# Patient Record
Sex: FEMALE
[De-identification: names, ages, dates, MRNs, and addresses within clinical notes are randomized per-mention and may not be internally consistent; named-entity substitution may affect disease eponyms.]

---

## 2024-08-21 ENCOUNTER — APPOINTMENT (OUTPATIENT)
Dept: PLASTIC SURGERY | Facility: CLINIC | Age: 49
End: 2024-08-21
Payer: COMMERCIAL

## 2024-08-21 VITALS — OXYGEN SATURATION: 99 % | HEIGHT: 68 IN | HEART RATE: 87 BPM | BODY MASS INDEX: 18.86 KG/M2 | WEIGHT: 124.45 LBS

## 2024-08-21 DIAGNOSIS — Z78.9 OTHER SPECIFIED HEALTH STATUS: ICD-10-CM

## 2024-08-21 DIAGNOSIS — F17.200 NICOTINE DEPENDENCE, UNSPECIFIED, UNCOMPLICATED: ICD-10-CM

## 2024-08-21 DIAGNOSIS — F64.9 GENDER IDENTITY DISORDER, UNSPECIFIED: ICD-10-CM

## 2024-08-21 PROBLEM — Z00.00 ENCOUNTER FOR PREVENTIVE HEALTH EXAMINATION: Status: ACTIVE | Noted: 2024-08-21

## 2024-08-21 PROCEDURE — 99204 OFFICE O/P NEW MOD 45 MIN: CPT

## 2024-08-21 RX ORDER — ESTRADIOL VALERATE
POWDER (GRAM) MISCELLANEOUS
Refills: 0 | Status: ACTIVE | COMMUNITY

## 2024-08-26 NOTE — DISCUSSION/SUMMARY
[FreeTextEntry1] : This friendly patient began transitioning since 19 years old She has been on hormonal therapy consistently since 30 years old She has been in therapy and was diagnosed with Gender Dysphoria She is concerned about certain facial features that appear masculine She desires facial feminization surgery and is followed by a Transgender team The following facial features appear masculine and will need to be modified in order for her to achieve a more feminine appearance: -Brow -Nose -Jawline -Cheeks -Neck   PSxHx: None   +Silicone injections in cheeks  PMedHx: Denied any   Medications: -Estrogen   FH: noncontributory   SH: + Smoke cigarettes, i pack per day for 3 years, occasional marijuana use,  no secondary tobacco use,   ROS: General health / Constitutional      no fever, no chills, no unusual weight changes, no energy level changes, no night sweats Skin       No color or pigmentation changes, no pruritis, no rashes, no ulcers, Hair       No changes in color, texture,  distribution, loss Nails       No color changes, brittleness, infection Head       No headaches, no new jaw pain Eyes       Good visual acuity, no color blindness, no corrective lenses, no photophobia, no diplopia, no blurred vision, no infection, pain, no medications, Ear      no tinnitus, no discharge, no pain, no medications Nose      no epistaxis, no rhinorrhea, no rhinitis, no pain, Mouth & Throat      no gingivitis, no gingival bleeding, no ulcers, no voice changes, no changes in oral mucosa or tongue Neck      no stiffness, no pain, no lymphadenitis, no thyroid disorders, Respiratory      no cough, no dyspnea, no wheezing,  no chest pain, cyanosis, no pneumonia, no asthma, Cardiovascular      no chest pain, no palpitations, no irregular rhythm, no tachycardia, no bradycardia, no heart failure, no dyspnea on exertion (FIGUEROA), no edema Gastrointestinal      no nausea / vomiting, no dysphagia, no reflux / GERD, no abdominal pain, no jaundice Musculoskeletal      no pain in muscles, bones, or joints; no fractures, no dislocations, no muscular weakness, no atrophy Neurological      no paresis, no paralysis, no paresthesia, no seizures, no dizziness, no syncope, no ataxia, no tremor Psychological      no childhood behavioral problems, no irritability, no sleep changes Hematological      no anemia, no bruising, no bleeding tendencies,   PHYSICAL EXAM General Thin and face drawn, in no distress,  A & O x 3 (person, place, time) HEENT Head: AT/NC (atraumatic, normocephalic), including TMJ, sensory and motor; very High forehead with recessed hairline, Prominent, bossed brow (Type III) and lateral orbital rim Eyes: EOMI, PERRLA Ears: exterior, nl hearing, Nose: thick skin, wide nasal dorsum, bulbous nasal tip with acute nasiolabial angle intranasal exam showed enlarged turbinates and deviated caudal septum Throat & mouth: gd palate elevation, nl tongue mobility, nl tonsil size Prominent mandibular angle with active masseter, widened lower face Wide, boxy chin Thin lips Hypoplastic malar and maxillary regions   Neck: no masses, nl pulses, excess submental fat with neck ptosis and lack of cervical-mental angle prominent tracheal bulge ("Isaac's Apple")   We had a 45 minute meeting with the patient discussing diagnosis and medical management issues and outcomes.  FFS: -Brow lift -frontal sinus setback -supraorbital brow recontouring -mandibular angle reduction b/l -chin narrowing -rhinoplasty, SMR, cartilage grafting -Submental fat excision and platysmaplasty (neck tightening) -cheek implants -tracheal shave   Needs a 3D CT scan and Virtual Surgical Planning She will need to provide a letter from her therapist and hormone provider   CPT 97526: Frontal sinus setback CPT 71532: Orbital reconstruction CPT 27862: Bilateral browlift, Hairline lowering CPT 61385: Supraorbital contouring CPT 65783 Mandibular angle resection CPT 58384: Osseous genioplasty CPT 89703: Septorhinoplasty CPT 60037: Cartilage grafting to nose CPT 51038: Tracheal Shave CPT 55158: Platysmaplasty, Necklift CPT 60797-65: Bilateral cheek implants  The following feature modifications will be requested based on the below medical necessity reasons:   97191 (Frontal sinus setback): Previous exposure to testosterone has led this patient to have a male appearing forehead with a more bossed shaped; this is opposed to a female appearing forehead that is more flat. A frontal sinus setback procedure will reshape the anterior wall of the frontal sinus by pushing back the bone and change the contour from convex (male-shape) to flat (female-shape). This surgical change will create a more flattened feminine brow appearance.  72587 (Browlift): Previous exposure to testosterone has led to the patient having a male M-shaped hairline as opposed to a female upside-down U-shaped hairline. Her receded hairline also creates a high, broad male appearing forehead. Her low set eyebrows on top of her orbital roof rim give her a beltrán, male-appearing look. Both of these male traits: a high hairline and low-set brows are causing her intense feelings of dysphoria. She would benefit from bilateral browlift and hairline lowering procedures. Raising her lateral eyebrow with a bilateral browlift will create a more female appearance. She has stressed a strong desire to wear her own natural hair and does not want to always have to wear a wig to cover up her male features.  89847 (bilateral orbital reconstruction): The bone growth that occurred during testosterone exposure in the upper half of each orbital has caused this patient to have male appearing orbital features that contribute to the sense of dysphoria she feels in public. Orbital reconstruction with a reciprocating saw for an L-shaped ostectomy, on both sides will help remove the excessive bony protrusion; this will be followed by bone material grafting and resorbable plate fixation. The bilateral orbital reconstruction will help alleviate these orbital and upper facial male features.  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 Supraorbital contouring of this lateral orbital region with a pineapple leona will correct this male feature that is causing this patient dysphoria. These procedures also allows us to do a success browlift procedure since the overhang of bone can inhibit the upper movement of the brow and the removal of this allows for an effective lift.  36033 (mandibular angle resection/reduction): This patient has an angular, more boxy jaw which results in male appearing lower face. She also has increased lower facial width related to her mandibular angle lateral projection. Mandibular angle resection/reduction will create a more feminine triangular jaw. By having a mandibular angle reduction, the lower facial width is narrowed and this will create a V-shaped, feminine appearance of the jawline when viewed from the front.  64015 (osseous genioplasty): This patient has a wide, large chin that contributes to her feelings of gender dysphoria and being mis-gendered in public. The patient would benefit from an osseous genioplasty that narrows and shortens the chin. The osseous genioplasty will help create a more feminine and more, slender chin.   56820 (Rhinoplasty): This patients nose has characteristics of a male nose. The male nose is often larger and wider with a more bulbous nasal tip. These male nasal features make her feel masculine which exacerbates her gender dysphoria. A rhinoplasty would be beneficial to feminize her nose by creating a smaller nose and more delicate, softer nasal tip. The lateral dorsal shape will also be feminized by the rhinoplasty. This patients nasal septum is shaped like a male septum. The septum is the supportive pole that holds up the structure of the nasal pyramid. In this case the septum will also be used to provide cartilage tissue necessary for nasal grafts. This septoplasty is required to modify the septum to create a straight nose with good functional breathing while taking away the characteristics of a male nose.  06693 (Cartilage grafting for nasal reconstruction): Cartilage grafting is crucial for the performance of the feminizing rhinoplasty. This patient has an ethnic type of nose. With regards to her nose, she wants to keep true to her ethnicity while appearing more feminine. To do that cartilage grafts including, bilateral  grafts, a columellar strut graft, a dorsal onlay graft, and nasal tip graft are all necessary. The dorsal onlay graft will raise the nasal radix and improve the frontonasal regional shape.  75169 (Platysmaplasty): With prolonged testosterone exposure, the submental region will appear full and ptotic. This patient has a full and ptotic submental and neck region which is associated with a male-appearing neck. The female neck is slender and tight. The platymaplasty, performed after submental fat excision, will help create a slender and tight, female appearing neck. At times we also do a partial resection of the digastric muscle and excise the submandibular glands to create a better cervicomental angle.  21112 (Tracheal shave or tracheolaryngoplasty): A prominent "Isaac's Apple" is a feature associated with males. Not all trans-women have a prominent "Isaac's Apple". However, this trans-woman has a prominent "Isaac's Apple" (also known as laryngeal prominence). This is seen on lateral head position and when her head is raised to the scot. It causes her intense feelings of dysphoria and it is a cause for misgendering. Therefore, the patient would benefit from a tracheal shave procedure which eliminates this prominent "Isaac's Apple" associated with male appearance.   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 She would benefit from implants lateral to the nose on both sides to establish a dailey appearance that softens the face so it will look less harsh.

## 2024-08-26 NOTE — DISCUSSION/SUMMARY
[FreeTextEntry1] : This friendly patient began transitioning since 19 years old She has been on hormonal therapy consistently since 30 years old She has been in therapy and was diagnosed with Gender Dysphoria She is concerned about certain facial features that appear masculine She desires facial feminization surgery and is followed by a Transgender team The following facial features appear masculine and will need to be modified in order for her to achieve a more feminine appearance: -Brow -Nose -Jawline -Cheeks -Neck   PSxHx: None   +Silicone injections in cheeks  PMedHx: Denied any   Medications: -Estrogen   FH: noncontributory   SH: + Smoke cigarettes, i pack per day for 3 years, occasional marijuana use,  no secondary tobacco use,   ROS: General health / Constitutional      no fever, no chills, no unusual weight changes, no energy level changes, no night sweats Skin       No color or pigmentation changes, no pruritis, no rashes, no ulcers, Hair       No changes in color, texture,  distribution, loss Nails       No color changes, brittleness, infection Head       No headaches, no new jaw pain Eyes       Good visual acuity, no color blindness, no corrective lenses, no photophobia, no diplopia, no blurred vision, no infection, pain, no medications, Ear      no tinnitus, no discharge, no pain, no medications Nose      no epistaxis, no rhinorrhea, no rhinitis, no pain, Mouth & Throat      no gingivitis, no gingival bleeding, no ulcers, no voice changes, no changes in oral mucosa or tongue Neck      no stiffness, no pain, no lymphadenitis, no thyroid disorders, Respiratory      no cough, no dyspnea, no wheezing,  no chest pain, cyanosis, no pneumonia, no asthma, Cardiovascular      no chest pain, no palpitations, no irregular rhythm, no tachycardia, no bradycardia, no heart failure, no dyspnea on exertion (FIGUEROA), no edema Gastrointestinal      no nausea / vomiting, no dysphagia, no reflux / GERD, no abdominal pain, no jaundice Musculoskeletal      no pain in muscles, bones, or joints; no fractures, no dislocations, no muscular weakness, no atrophy Neurological      no paresis, no paralysis, no paresthesia, no seizures, no dizziness, no syncope, no ataxia, no tremor Psychological      no childhood behavioral problems, no irritability, no sleep changes Hematological      no anemia, no bruising, no bleeding tendencies,   PHYSICAL EXAM General Thin and face drawn, in no distress,  A & O x 3 (person, place, time) HEENT Head: AT/NC (atraumatic, normocephalic), including TMJ, sensory and motor; very High forehead with recessed hairline, Prominent, bossed brow (Type III) and lateral orbital rim Eyes: EOMI, PERRLA Ears: exterior, nl hearing, Nose: thick skin, wide nasal dorsum, bulbous nasal tip with acute nasiolabial angle intranasal exam showed enlarged turbinates and deviated caudal septum Throat & mouth: gd palate elevation, nl tongue mobility, nl tonsil size Prominent mandibular angle with active masseter, widened lower face Wide, boxy chin Thin lips Hypoplastic malar and maxillary regions   Neck: no masses, nl pulses, excess submental fat with neck ptosis and lack of cervical-mental angle prominent tracheal bulge ("Isaac's Apple")   We had a 45 minute meeting with the patient discussing diagnosis and medical management issues and outcomes.  FFS: -Brow lift -frontal sinus setback -supraorbital brow recontouring -mandibular angle reduction b/l -chin narrowing -rhinoplasty, SMR, cartilage grafting -Submental fat excision and platysmaplasty (neck tightening) -cheek implants -tracheal shave   Needs a 3D CT scan and Virtual Surgical Planning She will need to provide a letter from her therapist and hormone provider   CPT 98210: Frontal sinus setback CPT 46101: Orbital reconstruction CPT 61314: Bilateral browlift, Hairline lowering CPT 36698: Supraorbital contouring CPT 31968 Mandibular angle resection CPT 66436: Osseous genioplasty CPT 58196: Septorhinoplasty CPT 71094: Cartilage grafting to nose CPT 65525: Tracheal Shave CPT 02781: Platysmaplasty, Necklift CPT 76652-74: Bilateral cheek implants  The following feature modifications will be requested based on the below medical necessity reasons:   84575 (Frontal sinus setback): Previous exposure to testosterone has led this patient to have a male appearing forehead with a more bossed shaped; this is opposed to a female appearing forehead that is more flat. A frontal sinus setback procedure will reshape the anterior wall of the frontal sinus by pushing back the bone and change the contour from convex (male-shape) to flat (female-shape). This surgical change will create a more flattened feminine brow appearance.  48817 (Browlift): Previous exposure to testosterone has led to the patient having a male M-shaped hairline as opposed to a female upside-down U-shaped hairline. Her receded hairline also creates a high, broad male appearing forehead. Her low set eyebrows on top of her orbital roof rim give her a beltrán, male-appearing look. Both of these male traits: a high hairline and low-set brows are causing her intense feelings of dysphoria. She would benefit from bilateral browlift and hairline lowering procedures. Raising her lateral eyebrow with a bilateral browlift will create a more female appearance. She has stressed a strong desire to wear her own natural hair and does not want to always have to wear a wig to cover up her male features.  24383 (bilateral orbital reconstruction): The bone growth that occurred during testosterone exposure in the upper half of each orbital has caused this patient to have male appearing orbital features that contribute to the sense of dysphoria she feels in public. Orbital reconstruction with a reciprocating saw for an L-shaped ostectomy, on both sides will help remove the excessive bony protrusion; this will be followed by bone material grafting and resorbable plate fixation. The bilateral orbital reconstruction will help alleviate these orbital and upper facial male features.  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 Supraorbital contouring of this lateral orbital region with a pineapple leona will correct this male feature that is causing this patient dysphoria. These procedures also allows us to do a success browlift procedure since the overhang of bone can inhibit the upper movement of the brow and the removal of this allows for an effective lift.  48864 (mandibular angle resection/reduction): This patient has an angular, more boxy jaw which results in male appearing lower face. She also has increased lower facial width related to her mandibular angle lateral projection. Mandibular angle resection/reduction will create a more feminine triangular jaw. By having a mandibular angle reduction, the lower facial width is narrowed and this will create a V-shaped, feminine appearance of the jawline when viewed from the front.  19119 (osseous genioplasty): This patient has a wide, large chin that contributes to her feelings of gender dysphoria and being mis-gendered in public. The patient would benefit from an osseous genioplasty that narrows and shortens the chin. The osseous genioplasty will help create a more feminine and more, slender chin.   00410 (Rhinoplasty): This patients nose has characteristics of a male nose. The male nose is often larger and wider with a more bulbous nasal tip. These male nasal features make her feel masculine which exacerbates her gender dysphoria. A rhinoplasty would be beneficial to feminize her nose by creating a smaller nose and more delicate, softer nasal tip. The lateral dorsal shape will also be feminized by the rhinoplasty. This patients nasal septum is shaped like a male septum. The septum is the supportive pole that holds up the structure of the nasal pyramid. In this case the septum will also be used to provide cartilage tissue necessary for nasal grafts. This septoplasty is required to modify the septum to create a straight nose with good functional breathing while taking away the characteristics of a male nose.  96516 (Cartilage grafting for nasal reconstruction): Cartilage grafting is crucial for the performance of the feminizing rhinoplasty. This patient has an ethnic type of nose. With regards to her nose, she wants to keep true to her ethnicity while appearing more feminine. To do that cartilage grafts including, bilateral  grafts, a columellar strut graft, a dorsal onlay graft, and nasal tip graft are all necessary. The dorsal onlay graft will raise the nasal radix and improve the frontonasal regional shape.  74501 (Platysmaplasty): With prolonged testosterone exposure, the submental region will appear full and ptotic. This patient has a full and ptotic submental and neck region which is associated with a male-appearing neck. The female neck is slender and tight. The platymaplasty, performed after submental fat excision, will help create a slender and tight, female appearing neck. At times we also do a partial resection of the digastric muscle and excise the submandibular glands to create a better cervicomental angle.  84033 (Tracheal shave or tracheolaryngoplasty): A prominent "Isaac's Apple" is a feature associated with males. Not all trans-women have a prominent "Isaac's Apple". However, this trans-woman has a prominent "Isaac's Apple" (also known as laryngeal prominence). This is seen on lateral head position and when her head is raised to the scot. It causes her intense feelings of dysphoria and it is a cause for misgendering. Therefore, the patient would benefit from a tracheal shave procedure which eliminates this prominent "Isaac's Apple" associated with male appearance.   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 She would benefit from implants lateral to the nose on both sides to establish a dailey appearance that softens the face so it will look less harsh.

## 2024-08-26 NOTE — DISCUSSION/SUMMARY
[FreeTextEntry1] : This friendly patient began transitioning since 19 years old She has been on hormonal therapy consistently since 30 years old She has been in therapy and was diagnosed with Gender Dysphoria She is concerned about certain facial features that appear masculine She desires facial feminization surgery and is followed by a Transgender team The following facial features appear masculine and will need to be modified in order for her to achieve a more feminine appearance: -Brow -Nose -Jawline -Cheeks -Neck   PSxHx: None   +Silicone injections in cheeks  PMedHx: Denied any   Medications: -Estrogen   FH: noncontributory   SH: + Smoke cigarettes, i pack per day for 3 years, occasional marijuana use,  no secondary tobacco use,   ROS: General health / Constitutional      no fever, no chills, no unusual weight changes, no energy level changes, no night sweats Skin       No color or pigmentation changes, no pruritis, no rashes, no ulcers, Hair       No changes in color, texture,  distribution, loss Nails       No color changes, brittleness, infection Head       No headaches, no new jaw pain Eyes       Good visual acuity, no color blindness, no corrective lenses, no photophobia, no diplopia, no blurred vision, no infection, pain, no medications, Ear      no tinnitus, no discharge, no pain, no medications Nose      no epistaxis, no rhinorrhea, no rhinitis, no pain, Mouth & Throat      no gingivitis, no gingival bleeding, no ulcers, no voice changes, no changes in oral mucosa or tongue Neck      no stiffness, no pain, no lymphadenitis, no thyroid disorders, Respiratory      no cough, no dyspnea, no wheezing,  no chest pain, cyanosis, no pneumonia, no asthma, Cardiovascular      no chest pain, no palpitations, no irregular rhythm, no tachycardia, no bradycardia, no heart failure, no dyspnea on exertion (FIGUEROA), no edema Gastrointestinal      no nausea / vomiting, no dysphagia, no reflux / GERD, no abdominal pain, no jaundice Musculoskeletal      no pain in muscles, bones, or joints; no fractures, no dislocations, no muscular weakness, no atrophy Neurological      no paresis, no paralysis, no paresthesia, no seizures, no dizziness, no syncope, no ataxia, no tremor Psychological      no childhood behavioral problems, no irritability, no sleep changes Hematological      no anemia, no bruising, no bleeding tendencies,   PHYSICAL EXAM General Thin and face drawn, in no distress,  A & O x 3 (person, place, time) HEENT Head: AT/NC (atraumatic, normocephalic), including TMJ, sensory and motor; very High forehead with recessed hairline, Prominent, bossed brow (Type III) and lateral orbital rim Eyes: EOMI, PERRLA Ears: exterior, nl hearing, Nose: thick skin, wide nasal dorsum, bulbous nasal tip with acute nasiolabial angle intranasal exam showed enlarged turbinates and deviated caudal septum Throat & mouth: gd palate elevation, nl tongue mobility, nl tonsil size Prominent mandibular angle with active masseter, widened lower face Wide, boxy chin Thin lips Hypoplastic malar and maxillary regions   Neck: no masses, nl pulses, excess submental fat with neck ptosis and lack of cervical-mental angle prominent tracheal bulge ("Isaac's Apple")   We had a 45 minute meeting with the patient discussing diagnosis and medical management issues and outcomes.  FFS: -Brow lift -frontal sinus setback -supraorbital brow recontouring -mandibular angle reduction b/l -chin narrowing -rhinoplasty, SMR, cartilage grafting -Submental fat excision and platysmaplasty (neck tightening) -cheek implants -tracheal shave   Needs a 3D CT scan and Virtual Surgical Planning She will need to provide a letter from her therapist and hormone provider   CPT 27413: Frontal sinus setback CPT 11629: Orbital reconstruction CPT 30955: Bilateral browlift, Hairline lowering CPT 59440: Supraorbital contouring CPT 91458 Mandibular angle resection CPT 58479: Osseous genioplasty CPT 42692: Septorhinoplasty CPT 80662: Cartilage grafting to nose CPT 18220: Tracheal Shave CPT 57642: Platysmaplasty, Necklift CPT 67854-31: Bilateral cheek implants  The following feature modifications will be requested based on the below medical necessity reasons:   92921 (Frontal sinus setback): Previous exposure to testosterone has led this patient to have a male appearing forehead with a more bossed shaped; this is opposed to a female appearing forehead that is more flat. A frontal sinus setback procedure will reshape the anterior wall of the frontal sinus by pushing back the bone and change the contour from convex (male-shape) to flat (female-shape). This surgical change will create a more flattened feminine brow appearance.  04052 (Browlift): Previous exposure to testosterone has led to the patient having a male M-shaped hairline as opposed to a female upside-down U-shaped hairline. Her receded hairline also creates a high, broad male appearing forehead. Her low set eyebrows on top of her orbital roof rim give her a beltrán, male-appearing look. Both of these male traits: a high hairline and low-set brows are causing her intense feelings of dysphoria. She would benefit from bilateral browlift and hairline lowering procedures. Raising her lateral eyebrow with a bilateral browlift will create a more female appearance. She has stressed a strong desire to wear her own natural hair and does not want to always have to wear a wig to cover up her male features.  23953 (bilateral orbital reconstruction): The bone growth that occurred during testosterone exposure in the upper half of each orbital has caused this patient to have male appearing orbital features that contribute to the sense of dysphoria she feels in public. Orbital reconstruction with a reciprocating saw for an L-shaped ostectomy, on both sides will help remove the excessive bony protrusion; this will be followed by bone material grafting and resorbable plate fixation. The bilateral orbital reconstruction will help alleviate these orbital and upper facial male features.  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 Supraorbital contouring of this lateral orbital region with a pineapple leona will correct this male feature that is causing this patient dysphoria. These procedures also allows us to do a success browlift procedure since the overhang of bone can inhibit the upper movement of the brow and the removal of this allows for an effective lift.  99155 (mandibular angle resection/reduction): This patient has an angular, more boxy jaw which results in male appearing lower face. She also has increased lower facial width related to her mandibular angle lateral projection. Mandibular angle resection/reduction will create a more feminine triangular jaw. By having a mandibular angle reduction, the lower facial width is narrowed and this will create a V-shaped, feminine appearance of the jawline when viewed from the front.  64532 (osseous genioplasty): This patient has a wide, large chin that contributes to her feelings of gender dysphoria and being mis-gendered in public. The patient would benefit from an osseous genioplasty that narrows and shortens the chin. The osseous genioplasty will help create a more feminine and more, slender chin.   80413 (Rhinoplasty): This patients nose has characteristics of a male nose. The male nose is often larger and wider with a more bulbous nasal tip. These male nasal features make her feel masculine which exacerbates her gender dysphoria. A rhinoplasty would be beneficial to feminize her nose by creating a smaller nose and more delicate, softer nasal tip. The lateral dorsal shape will also be feminized by the rhinoplasty. This patients nasal septum is shaped like a male septum. The septum is the supportive pole that holds up the structure of the nasal pyramid. In this case the septum will also be used to provide cartilage tissue necessary for nasal grafts. This septoplasty is required to modify the septum to create a straight nose with good functional breathing while taking away the characteristics of a male nose.  34920 (Cartilage grafting for nasal reconstruction): Cartilage grafting is crucial for the performance of the feminizing rhinoplasty. This patient has an ethnic type of nose. With regards to her nose, she wants to keep true to her ethnicity while appearing more feminine. To do that cartilage grafts including, bilateral  grafts, a columellar strut graft, a dorsal onlay graft, and nasal tip graft are all necessary. The dorsal onlay graft will raise the nasal radix and improve the frontonasal regional shape.  76297 (Platysmaplasty): With prolonged testosterone exposure, the submental region will appear full and ptotic. This patient has a full and ptotic submental and neck region which is associated with a male-appearing neck. The female neck is slender and tight. The platymaplasty, performed after submental fat excision, will help create a slender and tight, female appearing neck. At times we also do a partial resection of the digastric muscle and excise the submandibular glands to create a better cervicomental angle.  46206 (Tracheal shave or tracheolaryngoplasty): A prominent "Isaac's Apple" is a feature associated with males. Not all trans-women have a prominent "Isaac's Apple". However, this trans-woman has a prominent "Isaac's Apple" (also known as laryngeal prominence). This is seen on lateral head position and when her head is raised to the scot. It causes her intense feelings of dysphoria and it is a cause for misgendering. Therefore, the patient would benefit from a tracheal shave procedure which eliminates this prominent "Isaac's Apple" associated with male appearance.   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 She would benefit from implants lateral to the nose on both sides to establish a dailey appearance that softens the face so it will look less harsh.

## 2024-08-30 ENCOUNTER — APPOINTMENT (OUTPATIENT)
Dept: CT IMAGING | Facility: CLINIC | Age: 49
End: 2024-08-30

## 2024-09-20 ENCOUNTER — OUTPATIENT (OUTPATIENT)
Dept: OUTPATIENT SERVICES | Facility: HOSPITAL | Age: 49
LOS: 1 days | End: 2024-09-20

## 2024-12-13 ENCOUNTER — OUTPATIENT (OUTPATIENT)
Dept: OUTPATIENT SERVICES | Facility: HOSPITAL | Age: 49
LOS: 1 days | End: 2024-12-13

## 2024-12-13 ENCOUNTER — RESULT REVIEW (OUTPATIENT)
Age: 49
End: 2024-12-13

## 2024-12-13 ENCOUNTER — APPOINTMENT (OUTPATIENT)
Dept: CT IMAGING | Facility: CLINIC | Age: 49
End: 2024-12-13